# Patient Record
(demographics unavailable — no encounter records)

---

## 2024-11-26 NOTE — HISTORY OF PRESENT ILLNESS
[FreeTextEntry1] : 99-year-old female with history of diabetes, peripheral arterial disease initially presenting with ischemic ulceration of the base of the right great toe s/p right SFA stent who presents to the office today for routine follow-up.  Denies rest pain.  Denies tissue loss.  Patient is essentially nonambulatory.

## 2024-11-26 NOTE — ASSESSMENT
[FreeTextEntry1] : 99-year-old female with peripheral arterial disease status post right SFA stent.  Duplex demonstrates patent right SFA stent with 75% in-stent stenosis.  There is also stenosis in the native arteries.  No present rest pain or tissue loss.  Given patient's nonambulatory status and age patient to continue conservative management with aspirin daily.   Patient to follow-up in 6 months with repeat duplex. [Arterial/Venous Disease] : arterial/venous disease [Medication Management] : medication management

## 2024-11-26 NOTE — PHYSICAL EXAM
[JVD] : no jugular venous distention  [Normal Breath Sounds] : Normal breath sounds [Normal Rate and Rhythm] : normal rate and rhythm [2+] : left 2+ [Ankle Swelling (On Exam)] : not present [Ankle Swelling On The Left] : of the left ankle [Ankle Swelling On The Right] : mild [Varicose Veins Of Lower Extremities] : not present [] : not present [Abdomen Tenderness] : ~T ~M No abdominal tenderness [Skin Ulcer] : no ulcer [Alert] : alert [Calm] : calm [de-identified] : No palpable cords.  No calf tenderness. [de-identified] : Appears well